# Patient Record
Sex: MALE | Race: WHITE | HISPANIC OR LATINO | URBAN - METROPOLITAN AREA
[De-identification: names, ages, dates, MRNs, and addresses within clinical notes are randomized per-mention and may not be internally consistent; named-entity substitution may affect disease eponyms.]

---

## 2024-11-01 ENCOUNTER — TELEPHONE ENCOUNTER (OUTPATIENT)
Dept: URBAN - METROPOLITAN AREA CLINIC 36 | Facility: CLINIC | Age: 74
End: 2024-11-01

## 2024-11-07 ENCOUNTER — OFFICE VISIT (OUTPATIENT)
Dept: URBAN - METROPOLITAN AREA CLINIC 33 | Facility: CLINIC | Age: 74
End: 2024-11-07

## 2024-11-07 NOTE — HPI-ABDOMINAL PAIN
Patient presents today as a new patient for abdominal pain. The abdominal pain is located in the --- and is described as a ---. Episodes occur ---. Patient admits/denies that the abdominal pain has interrupted sleep. There has/has not been unintentional weight loss. Patient admits/denies nausea and vomiting. Aggravating factors include ---. Alleviating factors include ---. Patient has tried taking --- and states that it did/did not provide relief. Patient admits/denies associated gas and bloating. He admits/denies constipation. Patient admits/denies diarrhea. Patient is currently having --- bowel movements per ---,with/without strain. Stools are --- and ---. Patient admits/denies blood, mucous, or melena in stools. The abdominal pain is/is not relieved after patient has a bowel movement. He admits/denies any recent changes in his/her medication. He admits/denies having recent labs or imaging. Patient admits/denies having an EGD or colonoscopy. Patient's family history is positive/negative for colon cancer, esophageal cancer, or gastric cancer. He admits/denies sleep apnea, previous complications with anesthesia, bleeding disorders, respiratory diseases, or spinal cord injuries.

## 2024-11-22 ENCOUNTER — TELEPHONE ENCOUNTER (OUTPATIENT)
Dept: URBAN - METROPOLITAN AREA CLINIC 35 | Facility: CLINIC | Age: 74
End: 2024-11-22

## 2024-11-25 ENCOUNTER — LAB OUTSIDE AN ENCOUNTER (OUTPATIENT)
Dept: URBAN - METROPOLITAN AREA CLINIC 33 | Facility: CLINIC | Age: 74
End: 2024-11-25

## 2024-11-25 ENCOUNTER — OFFICE VISIT (OUTPATIENT)
Dept: URBAN - METROPOLITAN AREA CLINIC 33 | Facility: CLINIC | Age: 74
End: 2024-11-25
Payer: COMMERCIAL

## 2024-11-25 ENCOUNTER — DASHBOARD ENCOUNTERS (OUTPATIENT)
Age: 74
End: 2024-11-25

## 2024-11-25 VITALS
WEIGHT: 111 LBS | HEART RATE: 75 BPM | BODY MASS INDEX: 17.84 KG/M2 | HEIGHT: 66 IN | DIASTOLIC BLOOD PRESSURE: 76 MMHG | SYSTOLIC BLOOD PRESSURE: 160 MMHG | OXYGEN SATURATION: 98 %

## 2024-11-25 DIAGNOSIS — R63.4 WEIGHT LOSS: ICD-10-CM

## 2024-11-25 DIAGNOSIS — R13.19 ESOPHAGEAL DYSPHAGIA: ICD-10-CM

## 2024-11-25 DIAGNOSIS — R10.13 EPIGASTRIC PAIN: ICD-10-CM

## 2024-11-25 DIAGNOSIS — K80.20 CALCULUS OF GALLBLADDER WITHOUT CHOLECYSTITIS WITHOUT OBSTRUCTION: ICD-10-CM

## 2024-11-25 PROBLEM — 79922009: Status: ACTIVE | Noted: 2024-11-25

## 2024-11-25 PROBLEM — 40890009: Status: ACTIVE | Noted: 2024-11-25

## 2024-11-25 PROBLEM — 89362005: Status: ACTIVE | Noted: 2024-11-25

## 2024-11-25 PROBLEM — 70342003: Status: ACTIVE | Noted: 2024-11-25

## 2024-11-25 PROCEDURE — 99204 OFFICE O/P NEW MOD 45 MIN: CPT | Performed by: INTERNAL MEDICINE

## 2024-11-25 RX ORDER — LISINOPRIL 20 MG/1
TAKE 1 TABLET BY MOUTH ONCE DAILY TABLET ORAL
Qty: 90 EACH | Refills: 0 | Status: ACTIVE | COMMUNITY

## 2024-11-25 RX ORDER — ROSUVASTATIN CALCIUM 5 MG/1
TAKE 1 TABLET BY MOUTH EVERY DAY FOR 90 DAYS TABLET, FILM COATED ORAL
Qty: 90 EACH | Refills: 2 | Status: ACTIVE | COMMUNITY

## 2024-11-25 RX ORDER — CILOSTAZOL 50 MG/1
TAKE 1 TABLET BY MOUTH TWICE DAILY TABLET ORAL
Qty: 180 EACH | Refills: 0 | Status: ACTIVE | COMMUNITY

## 2024-11-25 RX ORDER — PANTOPRAZOLE 40 MG/1
TAKE 1 TABLET BY MOUTH ONCE DAILY TABLET, DELAYED RELEASE ORAL
Qty: 30 EACH | Refills: 0 | Status: ACTIVE | COMMUNITY

## 2024-11-25 RX ORDER — ONDANSETRON 4 MG/1
DISSOLVE 1 TABLET IN MOUTH EVERY 8 HOURS AS NEEDED TABLET, ORALLY DISINTEGRATING ORAL
Qty: 30 EACH | Refills: 0 | Status: ACTIVE | COMMUNITY

## 2024-11-25 RX ORDER — MEGESTROL ACETATE 20 MG/1
TAKE 1 TABLET TWICE DAILY AS NEEDED FOR HOT FLASHES TABLET ORAL
Qty: 60 EACH | Refills: 0 | Status: ACTIVE | COMMUNITY

## 2024-11-25 RX ORDER — TAMSULOSIN HYDROCHLORIDE 0.4 MG/1
TAKE 1 CAPSULE BY MOUTH ONCE DAILY FOR 30 DAYS CAPSULE ORAL
Qty: 30 EACH | Refills: 0 | Status: ACTIVE | COMMUNITY

## 2024-11-25 RX ORDER — TRAZODONE HYDROCHLORIDE 50 MG/1
TAKE 1 TABLET BY MOUTH EVERY DAY AT BEDTIME TABLET ORAL
Qty: 30 EACH | Refills: 0 | Status: ACTIVE | COMMUNITY

## 2024-11-25 RX ORDER — HYDROXYZINE HYDROCHLORIDE 50 MG/1
TAKE 1 TABLET BY MOUTH EVERYDAY AT BEDTIME TABLET, FILM COATED ORAL
Qty: 30 EACH | Refills: 0 | Status: ACTIVE | COMMUNITY

## 2024-11-25 RX ORDER — BUDESONIDE AND FORMOTEROL FUMARATE DIHYDRATE 160; 4.5 UG/1; UG/1
INHALE 2 PUFFS INTO THE LUNGS TWICE A DAY FOR 90 DAYS AEROSOL RESPIRATORY (INHALATION)
Qty: 10.2 GRAM | Refills: 1 | Status: ACTIVE | COMMUNITY

## 2024-11-25 RX ORDER — OXYCODONE AND ACETAMINOPHEN 10; 325 MG/1; MG/1
TABLET ORAL
Qty: 60 TABLET | Status: ACTIVE | COMMUNITY

## 2024-11-25 RX ORDER — CYCLOBENZAPRINE 5 MG/1
TAKE 1 TABLET BY MOUTH THREE TIMES A DAY AS NEEDED TABLET, FILM COATED ORAL
Qty: 90 EACH | Refills: 0 | Status: ACTIVE | COMMUNITY

## 2024-11-25 RX ORDER — LEVOTHYROXINE SODIUM 100 UG/1
TAKE 1 TABLET BY MOUTH EVERY DAY ON EMPTY STOMACH TABLET ORAL
Qty: 90 EACH | Refills: 0 | Status: ACTIVE | COMMUNITY

## 2024-11-25 RX ORDER — MELOXICAM 7.5 MG/1
TAKE 1 TABLET BY MOUTH EVERY DAY TABLET ORAL
Qty: 30 EACH | Refills: 0 | Status: ACTIVE | COMMUNITY

## 2024-11-25 NOTE — HPI-ABNORMAL FINDINGS
74 year old male patient presents for consult. He went to [facility] on 10/28 with abdominal pain. He had labs and imaging completed. RUQ US showed possible gallstones. Denies seeing a surgeon as referred by [facility] ED. States he went to PCP and his PCP referred him to see gastroenterologist. He admits intermittent epigastric and RUQ pains. Admits some nausea and vomiting, but takes zofran for relief. Admits losing 22lbs in four months but has gained 10lbs back in the last six weeks.   ABDOMINAL RUQ US  (10/28/2024)  IMPRESSION: The head and tail of the pancreas are obscured and not well seen. There is echogenic dependent layering foci within the gallbladder some of which shows faint shadowing that may reflect sludge and small gallstones. Negative sonographic Cortez sign. Right renal cyst.  CT ABD PELVIS  (07.12.2024)  IMPRESSION: 1.  No acute findings in the abdomen or pelvis. 2.  Cholelithiasis without evidence of acute cholecystitis. 3.  Atrophic left kidney with delayed heterogeneous nephrogram, likely related to chronic vascular disease. Although considered less likely, correlation with urinalysis and WBC is recommended to exclude pyelonephritis. 4.  Distended urinary bladder wall with thickening may relate to chronic outlet obstruction. Cystitis is not excluded. Correlate with urinalysis. 5.  Severe 75% compression deformity of the L1 vertebral body, significantly progressed from prior study. Correlate with point tenderness   OUTSIDE LABS  (10/28/2024) CMP  Creatinine - 1.40 H eGFR - 49.5 L All other values WNL  CBC w/DIFF/PLT   (10/24/2024) CMP Creatinine - 1.59 H eGFR - 46 L Sodium - 129 L Chloride - 96 L All other values WNL  CBC w/DIFF/PLT  WBC - 14.3 H RBC - 3.39 L Hgb - 10.7 L Hematocrit - 32.7 L Platelet - 562 H Neutrophils (absolute)  - 9910 H  TSH - 5.64 H  (06/08/2024) CMP Sodium - 133 L All other values WNL  CBC w/DIFF/PLT  RBC - 3.60 L Hgb - 11.3 L Hematocrit - 33.9 L Platelet Count - 458 H All other values WNL

## 2024-11-25 NOTE — HPI-DYSPHAGIA
74 year old male patient presents today for consultation about dysphagia. Onset occurs upon oral intake. Dysphagia occurs with most solids, but sausage is occasional okay. No symptoms with liquids. Denies having previous EGD or Barium Swallow completed.

## 2024-12-11 ENCOUNTER — OFFICE VISIT (OUTPATIENT)
Dept: URBAN - METROPOLITAN AREA MEDICAL CENTER 10 | Facility: MEDICAL CENTER | Age: 74
End: 2024-12-11

## 2024-12-23 ENCOUNTER — OFFICE VISIT (OUTPATIENT)
Dept: URBAN - METROPOLITAN AREA CLINIC 33 | Facility: CLINIC | Age: 74
End: 2024-12-23

## 2025-01-21 ENCOUNTER — TELEPHONE ENCOUNTER (OUTPATIENT)
Dept: URBAN - METROPOLITAN AREA CLINIC 33 | Facility: CLINIC | Age: 75
End: 2025-01-21